# Patient Record
Sex: FEMALE | Race: WHITE | Employment: OTHER | URBAN - METROPOLITAN AREA
[De-identification: names, ages, dates, MRNs, and addresses within clinical notes are randomized per-mention and may not be internally consistent; named-entity substitution may affect disease eponyms.]

---

## 2017-04-27 PROBLEM — I50.9 CHRONIC CONGESTIVE HEART FAILURE, UNSPECIFIED CONGESTIVE HEART FAILURE TYPE: Status: ACTIVE | Noted: 2017-04-27

## 2017-06-06 PROBLEM — Z47.89 ORTHOPEDIC AFTERCARE: Status: ACTIVE | Noted: 2017-06-06

## 2017-06-15 PROBLEM — M17.12 PRIMARY OSTEOARTHRITIS OF LEFT KNEE: Status: ACTIVE | Noted: 2017-06-15

## 2018-05-02 PROBLEM — I70.0 AORTIC ATHEROSCLEROSIS (HCC): Status: ACTIVE | Noted: 2018-05-02

## 2018-05-03 PROBLEM — F33.1 MODERATE EPISODE OF RECURRENT MAJOR DEPRESSIVE DISORDER (HCC): Status: ACTIVE | Noted: 2018-05-03

## 2018-05-03 PROCEDURE — 87088 URINE BACTERIA CULTURE: CPT | Performed by: FAMILY MEDICINE

## 2018-05-03 PROCEDURE — 87186 SC STD MICRODIL/AGAR DIL: CPT | Performed by: FAMILY MEDICINE

## 2018-05-03 PROCEDURE — 87086 URINE CULTURE/COLONY COUNT: CPT | Performed by: FAMILY MEDICINE

## 2018-05-13 PROBLEM — M17.12 PRIMARY OSTEOARTHRITIS OF LEFT KNEE: Status: RESOLVED | Noted: 2017-06-15 | Resolved: 2018-05-13

## 2018-05-13 PROBLEM — I50.9 CHRONIC CONGESTIVE HEART FAILURE, UNSPECIFIED CONGESTIVE HEART FAILURE TYPE: Status: RESOLVED | Noted: 2017-04-27 | Resolved: 2018-05-13

## 2018-05-13 PROBLEM — Z47.89 ORTHOPEDIC AFTERCARE: Status: RESOLVED | Noted: 2017-06-06 | Resolved: 2018-05-13

## 2018-05-13 PROBLEM — I50.9 CHRONIC CONGESTIVE HEART FAILURE, UNSPECIFIED HEART FAILURE TYPE (HCC): Status: ACTIVE | Noted: 2017-04-27

## 2018-09-04 PROCEDURE — 81003 URINALYSIS AUTO W/O SCOPE: CPT | Performed by: FAMILY MEDICINE

## 2018-09-04 PROCEDURE — 87086 URINE CULTURE/COLONY COUNT: CPT | Performed by: FAMILY MEDICINE

## 2018-09-13 PROBLEM — R55 SYNCOPE, UNSPECIFIED SYNCOPE TYPE: Status: ACTIVE | Noted: 2018-09-13

## 2019-05-13 PROBLEM — J84.10 LUNG FIBROSIS (HCC): Status: ACTIVE | Noted: 2019-05-13

## 2020-10-06 PROBLEM — G31.9 CEREBELLAR ATROPHY (HCC): Status: ACTIVE | Noted: 2020-10-06

## 2020-10-27 PROBLEM — E66.01 OBESITY, MORBID (HCC): Status: ACTIVE | Noted: 2020-10-27

## 2020-11-04 PROBLEM — I10 ESSENTIAL HYPERTENSION: Status: ACTIVE | Noted: 2020-11-04

## 2021-03-10 DIAGNOSIS — Z23 NEED FOR VACCINATION: ICD-10-CM

## 2021-04-07 ENCOUNTER — OFFICE VISIT (OUTPATIENT)
Dept: DERMATOLOGY | Age: 86
End: 2021-04-07

## 2021-04-07 DIAGNOSIS — L81.4 LENTIGINES: ICD-10-CM

## 2021-04-07 DIAGNOSIS — D48.5 NEOPLASM OF UNCERTAIN BEHAVIOR OF SKIN: Primary | ICD-10-CM

## 2021-04-07 DIAGNOSIS — L82.1 OTHER SEBORRHEIC KERATOSIS: ICD-10-CM

## 2021-04-07 PROCEDURE — 99203 OFFICE O/P NEW LOW 30 MIN: CPT | Performed by: DERMATOLOGY

## 2021-04-07 PROCEDURE — 11102 TANGNTL BX SKIN SINGLE LES: CPT | Performed by: DERMATOLOGY

## 2021-04-07 RX ORDER — AMLODIPINE BESYLATE 5 MG/1
TABLET ORAL
COMMUNITY
Start: 2021-03-23

## 2021-04-07 RX ORDER — ASPIRIN 81 MG/1
TABLET ORAL
COMMUNITY

## 2021-04-07 RX ORDER — OXYBUTYNIN CHLORIDE 10 MG/1
TABLET, EXTENDED RELEASE ORAL
COMMUNITY
Start: 2021-03-15

## 2021-04-07 RX ORDER — MULTIVITAMIN,THER AND MINERALS
1 TABLET ORAL
COMMUNITY

## 2021-04-07 RX ORDER — METOPROLOL SUCCINATE 50 MG/1
TABLET, EXTENDED RELEASE ORAL
COMMUNITY
Start: 2021-03-04

## 2021-04-07 RX ORDER — FUROSEMIDE 20 MG/1
TABLET ORAL
COMMUNITY
Start: 2021-03-23

## 2021-04-07 RX ORDER — PANTOPRAZOLE SODIUM 40 MG/1
TABLET, DELAYED RELEASE ORAL
COMMUNITY
Start: 2021-03-04

## 2021-04-12 LAB — PATH REPORT PLASRBC-IMP: NORMAL

## 2021-04-16 ENCOUNTER — TELEPHONE (OUTPATIENT)
Dept: OTOLARYNGOLOGY | Age: 86
End: 2021-04-16

## 2021-04-16 DIAGNOSIS — D04.39 SQUAMOUS CELL CARCINOMA IN SITU (SCCIS) OF SKIN OF CHEEK: Primary | ICD-10-CM

## 2021-05-03 ENCOUNTER — LAB REQUISITION (OUTPATIENT)
Dept: LAB | Age: 86
End: 2021-05-03

## 2021-05-03 ENCOUNTER — OFFICE VISIT (OUTPATIENT)
Dept: OTOLARYNGOLOGY | Age: 86
End: 2021-05-03

## 2021-05-03 DIAGNOSIS — C44.92 SQUAMOUS CELL CARCINOMA OF SKIN: ICD-10-CM

## 2021-05-03 DIAGNOSIS — C44.92 SQUAMOUS CELL CARCINOMA OF SKIN: Primary | ICD-10-CM

## 2021-05-03 DIAGNOSIS — L57.0 ACTINIC KERATOSIS: ICD-10-CM

## 2021-05-03 DIAGNOSIS — D04.39 CARCINOMA IN SITU OF SKIN OF OTHER PARTS OF FACE: ICD-10-CM

## 2021-05-03 DIAGNOSIS — C44.92 SQUAMOUS CELL CARCINOMA OF SKIN, UNSPECIFIED: ICD-10-CM

## 2021-05-03 DIAGNOSIS — D04.39 SQUAMOUS CELL CARCINOMA IN SITU (SCCIS) OF SKIN OF CHEEK: ICD-10-CM

## 2021-05-03 PROCEDURE — 88305 TISSUE EXAM BY PATHOLOGIST: CPT | Performed by: PATHOLOGY

## 2021-05-03 PROCEDURE — 88305 TISSUE EXAM BY PATHOLOGIST: CPT | Performed by: CLINICAL MEDICAL LABORATORY

## 2021-05-03 PROCEDURE — 88331 PATH CONSLTJ SURG 1 BLK 1SPC: CPT | Performed by: PATHOLOGY

## 2021-05-03 PROCEDURE — 12051 INTMD RPR FACE/MM 2.5 CM/<: CPT | Performed by: OTOLARYNGOLOGY

## 2021-05-03 PROCEDURE — 11641 EXC F/E/E/N/L MAL+MRG 0.6-1: CPT | Performed by: OTOLARYNGOLOGY

## 2021-05-04 LAB — AP REPORT: NORMAL

## 2021-05-05 ENCOUNTER — TELEPHONE (OUTPATIENT)
Dept: OTOLARYNGOLOGY | Age: 86
End: 2021-05-05

## 2021-05-10 ENCOUNTER — OFFICE VISIT (OUTPATIENT)
Dept: OTOLARYNGOLOGY | Age: 86
End: 2021-05-10

## 2021-05-10 DIAGNOSIS — D04.39 SQUAMOUS CELL CARCINOMA IN SITU (SCCIS) OF SKIN OF CHEEK: Primary | ICD-10-CM

## 2021-05-10 PROCEDURE — 99024 POSTOP FOLLOW-UP VISIT: CPT | Performed by: OTOLARYNGOLOGY

## 2021-07-16 LAB
CASE RPRT: NORMAL
PATH REPORT.FINAL DX SPEC: NORMAL

## 2021-07-22 ENCOUNTER — TELEPHONE (OUTPATIENT)
Dept: DERMATOLOGY | Age: 86
End: 2021-07-22

## 2021-07-28 ENCOUNTER — EXTERNAL RECORD (OUTPATIENT)
Dept: HEALTH INFORMATION MANAGEMENT | Facility: OTHER | Age: 86
End: 2021-07-28

## 2021-10-06 ENCOUNTER — APPOINTMENT (OUTPATIENT)
Dept: DERMATOLOGY | Age: 86
End: 2021-10-06

## 2022-03-03 PROBLEM — J84.10 LUNG FIBROSIS (HCC): Status: RESOLVED | Noted: 2019-05-13 | Resolved: 2022-03-03

## 2022-03-03 PROBLEM — E66.01 OBESITY, MORBID (HCC): Status: RESOLVED | Noted: 2020-10-27 | Resolved: 2022-03-03

## 2022-03-03 PROBLEM — E66.9 OBESITY (BMI 30-39.9): Status: ACTIVE | Noted: 2022-03-03

## 2022-03-03 PROBLEM — R55 SYNCOPE, UNSPECIFIED SYNCOPE TYPE: Status: RESOLVED | Noted: 2018-09-13 | Resolved: 2022-03-03

## 2022-03-03 PROBLEM — E66.9 OBESITY (BMI 30-39.9): Status: RESOLVED | Noted: 2022-03-03 | Resolved: 2022-03-03

## 2022-03-03 PROBLEM — E11.9 TYPE 2 DIABETES MELLITUS WITHOUT COMPLICATION, WITHOUT LONG-TERM CURRENT USE OF INSULIN (HCC): Status: ACTIVE | Noted: 2022-03-03

## 2023-09-06 ENCOUNTER — TELEPHONE (OUTPATIENT)
Dept: FAMILY MEDICINE CLINIC | Facility: CLINIC | Age: 88
End: 2023-09-06

## 2023-09-06 RX ORDER — CHOLECALCIFEROL (VITAMIN D3) 125 MCG
2000 CAPSULE ORAL DAILY
COMMUNITY

## 2023-09-06 RX ORDER — LEVOTHYROXINE SODIUM 0.05 MG/1
1 TABLET ORAL DAILY
COMMUNITY
Start: 2023-07-15

## 2023-09-06 RX ORDER — FAMOTIDINE 20 MG/1
20 TABLET, FILM COATED ORAL NIGHTLY PRN
COMMUNITY

## 2023-09-06 NOTE — TELEPHONE ENCOUNTER
09/26/2023 RTKA with Dr. Mirela Nieves at [address] [place] [address]    H&P- completed 09/08/23  Labs - CBC and BMP done 08/28/23- BUN (23.0), MPV (12.2). A1C (6.7), MRSA neg, all other labs WNL  EKG - normal sinus rhythm, right axis deviation, low voltage QRS, consider pulmonary disease, pericardial effusion, or normal variant. CXR - 12/26/2022 ([facility]) - Mild thoracic hyperexpansion, reflecting generalized air trapping, likely due to acute-chronic   bronchitis and/or diffuse pulmonary emphysema, without airspace consolidation or pleural effusions. 08/29/2023 Nuclear Stress Test ([facility]) - Normal  02/28/2023 Echo ([facility]) - mild aortic, mitral, and tricuspid valve regurgitation      Cardiology: Dr. Ann Marie Escalante  Phone: [phone]  Fax: [phone]  Cardiac Clx scanned into Media 09/19/23  \"Patient is at acceptable risks for upcoming knee surgery, may hold ASA for 7 days. \"

## 2023-09-06 NOTE — TELEPHONE ENCOUNTER
RTKA on 09/19/23 with Dr. Fabiano Figueroa @ [address] [place] [address]&P-  Labs-  [facility]-  X-ray-

## 2023-09-08 ENCOUNTER — OFFICE VISIT (OUTPATIENT)
Dept: FAMILY MEDICINE CLINIC | Facility: CLINIC | Age: 88
End: 2023-09-08
Payer: MEDICARE

## 2023-09-08 ENCOUNTER — LAB ENCOUNTER (OUTPATIENT)
Dept: LAB | Facility: HOSPITAL | Age: 88
End: 2023-09-08
Attending: FAMILY MEDICINE
Payer: MEDICARE

## 2023-09-08 VITALS
DIASTOLIC BLOOD PRESSURE: 62 MMHG | HEIGHT: 64 IN | BODY MASS INDEX: 36.7 KG/M2 | RESPIRATION RATE: 16 BRPM | WEIGHT: 215 LBS | OXYGEN SATURATION: 96 % | SYSTOLIC BLOOD PRESSURE: 118 MMHG | HEART RATE: 72 BPM | TEMPERATURE: 96 F

## 2023-09-08 DIAGNOSIS — E66.9 OBESITY (BMI 30-39.9): ICD-10-CM

## 2023-09-08 DIAGNOSIS — M17.11 PRIMARY OSTEOARTHRITIS OF RIGHT KNEE: Primary | ICD-10-CM

## 2023-09-08 DIAGNOSIS — I27.20 PULMONARY HYPERTENSION (HCC): ICD-10-CM

## 2023-09-08 DIAGNOSIS — Z85.3 HISTORY OF BREAST CANCER: ICD-10-CM

## 2023-09-08 DIAGNOSIS — R73.01 ELEVATED FASTING BLOOD SUGAR: ICD-10-CM

## 2023-09-08 DIAGNOSIS — I10 ESSENTIAL HYPERTENSION: ICD-10-CM

## 2023-09-08 DIAGNOSIS — Z01.812 PRE-OPERATIVE LABORATORY EXAMINATION: ICD-10-CM

## 2023-09-08 DIAGNOSIS — F33.1 MODERATE EPISODE OF RECURRENT MAJOR DEPRESSIVE DISORDER (HCC): ICD-10-CM

## 2023-09-08 DIAGNOSIS — Z01.818 PREOPERATIVE EXAMINATION, UNSPECIFIED: ICD-10-CM

## 2023-09-08 DIAGNOSIS — E11.9 TYPE 2 DIABETES MELLITUS WITHOUT COMPLICATION, WITHOUT LONG-TERM CURRENT USE OF INSULIN (HCC): ICD-10-CM

## 2023-09-08 DIAGNOSIS — E78.5 DYSLIPIDEMIA: ICD-10-CM

## 2023-09-08 DIAGNOSIS — M47.816 LUMBAR SPONDYLOSIS: ICD-10-CM

## 2023-09-08 DIAGNOSIS — G31.9 CEREBELLAR ATROPHY (HCC): ICD-10-CM

## 2023-09-08 DIAGNOSIS — K21.9 GASTROESOPHAGEAL REFLUX DISEASE WITHOUT ESOPHAGITIS: ICD-10-CM

## 2023-09-08 DIAGNOSIS — I49.3 PVC (PREMATURE VENTRICULAR CONTRACTION): ICD-10-CM

## 2023-09-08 DIAGNOSIS — I70.0 AORTIC ATHEROSCLEROSIS (HCC): ICD-10-CM

## 2023-09-08 DIAGNOSIS — J41.0 SIMPLE CHRONIC BRONCHITIS (HCC): ICD-10-CM

## 2023-09-08 DIAGNOSIS — I50.9 CHRONIC CONGESTIVE HEART FAILURE, UNSPECIFIED HEART FAILURE TYPE (HCC): ICD-10-CM

## 2023-09-08 LAB
ANTIBODY SCREEN: POSITIVE
DIRECT COOMBS POLY: NEGATIVE
EST. AVERAGE GLUCOSE BLD GHB EST-MCNC: 146 MG/DL (ref 68–126)
HBA1C MFR BLD: 6.7 % (ref ?–5.7)
RH BLOOD TYPE: NEGATIVE
RH BLOOD TYPE: NEGATIVE

## 2023-09-08 PROCEDURE — 87081 CULTURE SCREEN ONLY: CPT

## 2023-09-08 PROCEDURE — 99204 OFFICE O/P NEW MOD 45 MIN: CPT | Performed by: FAMILY MEDICINE

## 2023-09-08 PROCEDURE — 3074F SYST BP LT 130 MM HG: CPT | Performed by: FAMILY MEDICINE

## 2023-09-08 PROCEDURE — 1159F MED LIST DOCD IN RCRD: CPT | Performed by: FAMILY MEDICINE

## 2023-09-08 PROCEDURE — 86077 PHYS BLOOD BANK SERV XMATCH: CPT | Performed by: FAMILY MEDICINE

## 2023-09-08 PROCEDURE — 1125F AMNT PAIN NOTED PAIN PRSNT: CPT | Performed by: FAMILY MEDICINE

## 2023-09-08 PROCEDURE — 86850 RBC ANTIBODY SCREEN: CPT | Performed by: FAMILY MEDICINE

## 2023-09-08 PROCEDURE — 86870 RBC ANTIBODY IDENTIFICATION: CPT | Performed by: FAMILY MEDICINE

## 2023-09-08 PROCEDURE — 86900 BLOOD TYPING SEROLOGIC ABO: CPT | Performed by: FAMILY MEDICINE

## 2023-09-08 PROCEDURE — 83036 HEMOGLOBIN GLYCOSYLATED A1C: CPT

## 2023-09-08 PROCEDURE — 86901 BLOOD TYPING SEROLOGIC RH(D): CPT | Performed by: FAMILY MEDICINE

## 2023-09-08 PROCEDURE — 93010 ELECTROCARDIOGRAM REPORT: CPT | Performed by: INTERNAL MEDICINE

## 2023-09-08 PROCEDURE — 3078F DIAST BP <80 MM HG: CPT | Performed by: FAMILY MEDICINE

## 2023-09-08 PROCEDURE — 36415 COLL VENOUS BLD VENIPUNCTURE: CPT

## 2023-09-08 PROCEDURE — 86880 COOMBS TEST DIRECT: CPT | Performed by: FAMILY MEDICINE

## 2023-09-08 PROCEDURE — 3008F BODY MASS INDEX DOCD: CPT | Performed by: FAMILY MEDICINE

## 2023-09-08 PROCEDURE — 1160F RVW MEDS BY RX/DR IN RCRD: CPT | Performed by: FAMILY MEDICINE

## 2023-09-08 PROCEDURE — 93005 ELECTROCARDIOGRAM TRACING: CPT

## 2023-09-09 LAB
ATRIAL RATE: 70 BPM
P-R INTERVAL: 124 MS
Q-T INTERVAL: 404 MS
QRS DURATION: 84 MS
QTC CALCULATION (BEZET): 436 MS
R AXIS: 136 DEGREES
T AXIS: 147 DEGREES
VENTRICULAR RATE: 70 BPM

## 2023-09-19 NOTE — TELEPHONE ENCOUNTER
Dr. Veronica Rose, please review:    Labs - CBC and BMP done 08/28/23- BUN (23.0), MPV (12.2). A1C (6.7), MRSA neg, all other labs WNL  EKG - normal sinus rhythm, right axis deviation, low voltage QRS, consider pulmonary disease, pericardial effusion, or normal variant. CXR - 12/26/2022 ([facility]) - Mild thoracic hyperexpansion, reflecting generalized air trapping, likely due to acute-chronic   bronchitis and/or diffuse pulmonary emphysema, without airspace consolidation or pleural effusions. Cardiology: Dr. Anat Hardwick  Phone: [phone]  Fax: [phone]  Cardiac Clx scanned into Media 09/19/23  \"Patient is at acceptable risks for upcoming knee surgery, may hold ASA for 7 days. \"    OK for surgery?

## 2023-09-20 PROBLEM — M17.11 PRIMARY OSTEOARTHRITIS OF ONE KNEE, RIGHT: Status: ACTIVE | Noted: 2023-09-20

## 2023-09-21 NOTE — TELEPHONE ENCOUNTER
Left message for patient with test results, that she is clear for surgery, and that she should still be holding ASA as directed by Cardio. Patient can call back if she has any questions.     Cell# not working

## 2023-09-22 NOTE — CM/SW NOTE
SW was notified the pt. Is a pre-ortho with [facility]. [address] [place] [address] to send [facility] referral post op. [facility] orders and face to face to be sent after entered by APN.       [facility] ext [phone]

## 2023-09-26 ENCOUNTER — HOSPITAL ENCOUNTER (INPATIENT)
Facility: HOSPITAL | Age: 88
LOS: 1 days | Discharge: HOME HEALTH | DRG: 470 | End: 2023-09-27
Attending: ORTHOPAEDIC SURGERY | Admitting: ORTHOPAEDIC SURGERY
Payer: MEDICARE

## 2023-09-26 ENCOUNTER — ANESTHESIA EVENT (OUTPATIENT)
Dept: SURGERY | Facility: HOSPITAL | Age: 88
DRG: 470 | End: 2023-09-26
Payer: MEDICARE

## 2023-09-26 ENCOUNTER — APPOINTMENT (OUTPATIENT)
Dept: GENERAL RADIOLOGY | Facility: HOSPITAL | Age: 88
DRG: 470 | End: 2023-09-26
Attending: ORTHOPAEDIC SURGERY
Payer: MEDICARE

## 2023-09-26 ENCOUNTER — ANESTHESIA (OUTPATIENT)
Dept: SURGERY | Facility: HOSPITAL | Age: 88
DRG: 470 | End: 2023-09-26
Payer: MEDICARE

## 2023-09-26 PROBLEM — M17.11 OSTEOARTHRITIS OF RIGHT KNEE, UNSPECIFIED OSTEOARTHRITIS TYPE: Status: ACTIVE | Noted: 2023-09-26

## 2023-09-26 LAB
ANION GAP SERPL CALC-SCNC: 5 MMOL/L (ref 0–18)
BASOPHILS # BLD AUTO: 0.08 X10(3) UL (ref 0–0.2)
BASOPHILS NFR BLD AUTO: 0.7 %
BUN BLD-MCNC: 21 MG/DL (ref 7–18)
BUN/CREAT SERPL: 21.2 (ref 10–20)
CALCIUM BLD-MCNC: 8.4 MG/DL (ref 8.5–10.1)
CHLORIDE SERPL-SCNC: 106 MMOL/L (ref 98–112)
CO2 SERPL-SCNC: 30 MMOL/L (ref 21–32)
CREAT BLD-MCNC: 0.99 MG/DL
DEPRECATED RDW RBC AUTO: 48.4 FL (ref 35.1–46.3)
EGFRCR SERPLBLD CKD-EPI 2021: 56 ML/MIN/1.73M2 (ref 60–?)
EOSINOPHIL # BLD AUTO: 0.07 X10(3) UL (ref 0–0.7)
EOSINOPHIL NFR BLD AUTO: 0.6 %
ERYTHROCYTE [DISTWIDTH] IN BLOOD BY AUTOMATED COUNT: 13.2 % (ref 11–15)
GLUCOSE BLD-MCNC: 160 MG/DL (ref 70–99)
GLUCOSE BLDC GLUCOMTR-MCNC: 113 MG/DL (ref 70–99)
GLUCOSE BLDC GLUCOMTR-MCNC: 123 MG/DL (ref 70–99)
GLUCOSE BLDC GLUCOMTR-MCNC: 126 MG/DL (ref 70–99)
GLUCOSE BLDC GLUCOMTR-MCNC: 130 MG/DL (ref 70–99)
HCT VFR BLD AUTO: 48 %
HGB BLD-MCNC: 15 G/DL
IMM GRANULOCYTES # BLD AUTO: 0.05 X10(3) UL (ref 0–1)
IMM GRANULOCYTES NFR BLD: 0.4 %
LYMPHOCYTES # BLD AUTO: 3.29 X10(3) UL (ref 1–4)
LYMPHOCYTES NFR BLD AUTO: 26.8 %
MAGNESIUM SERPL-MCNC: 1.8 MG/DL (ref 1.6–2.6)
MCH RBC QN AUTO: 30.7 PG (ref 26–34)
MCHC RBC AUTO-ENTMCNC: 31.3 G/DL (ref 31–37)
MCV RBC AUTO: 98.4 FL
MONOCYTES # BLD AUTO: 1.18 X10(3) UL (ref 0.1–1)
MONOCYTES NFR BLD AUTO: 9.6 %
NEUTROPHILS # BLD AUTO: 7.59 X10 (3) UL (ref 1.5–7.7)
NEUTROPHILS # BLD AUTO: 7.59 X10(3) UL (ref 1.5–7.7)
NEUTROPHILS NFR BLD AUTO: 61.9 %
OSMOLALITY SERPL CALC.SUM OF ELEC: 298 MOSM/KG (ref 275–295)
PLATELET # BLD AUTO: 335 10(3)UL (ref 150–450)
POTASSIUM SERPL-SCNC: 3.9 MMOL/L (ref 3.5–5.1)
Q-T INTERVAL: 334 MS
QRS DURATION: 98 MS
QTC CALCULATION (BEZET): 449 MS
R AXIS: 41 DEGREES
RBC # BLD AUTO: 4.88 X10(6)UL
SODIUM SERPL-SCNC: 141 MMOL/L (ref 136–145)
T AXIS: -27 DEGREES
T4 FREE SERPL-MCNC: 1.3 NG/DL (ref 0.8–1.7)
TSI SER-ACNC: 5.29 MIU/ML (ref 0.36–3.74)
VENTRICULAR RATE: 109 BPM
WBC # BLD AUTO: 12.3 X10(3) UL (ref 4–11)

## 2023-09-26 PROCEDURE — 93010 ELECTROCARDIOGRAM REPORT: CPT | Performed by: INTERNAL MEDICINE

## 2023-09-26 PROCEDURE — 88311 DECALCIFY TISSUE: CPT | Performed by: ORTHOPAEDIC SURGERY

## 2023-09-26 PROCEDURE — 82962 GLUCOSE BLOOD TEST: CPT

## 2023-09-26 PROCEDURE — 93005 ELECTROCARDIOGRAM TRACING: CPT

## 2023-09-26 PROCEDURE — 88305 TISSUE EXAM BY PATHOLOGIST: CPT | Performed by: ORTHOPAEDIC SURGERY

## 2023-09-26 PROCEDURE — 73560 X-RAY EXAM OF KNEE 1 OR 2: CPT | Performed by: ORTHOPAEDIC SURGERY

## 2023-09-26 PROCEDURE — 84443 ASSAY THYROID STIM HORMONE: CPT | Performed by: INTERNAL MEDICINE

## 2023-09-26 PROCEDURE — 80048 BASIC METABOLIC PNL TOTAL CA: CPT | Performed by: INTERNAL MEDICINE

## 2023-09-26 PROCEDURE — 97530 THERAPEUTIC ACTIVITIES: CPT

## 2023-09-26 PROCEDURE — 85025 COMPLETE CBC W/AUTO DIFF WBC: CPT | Performed by: INTERNAL MEDICINE

## 2023-09-26 PROCEDURE — 0SRC0J9 REPLACEMENT OF RIGHT KNEE JOINT WITH SYNTHETIC SUBSTITUTE, CEMENTED, OPEN APPROACH: ICD-10-PCS | Performed by: ORTHOPAEDIC SURGERY

## 2023-09-26 PROCEDURE — 97161 PT EVAL LOW COMPLEX 20 MIN: CPT

## 2023-09-26 PROCEDURE — 84439 ASSAY OF FREE THYROXINE: CPT | Performed by: INTERNAL MEDICINE

## 2023-09-26 PROCEDURE — 83735 ASSAY OF MAGNESIUM: CPT | Performed by: INTERNAL MEDICINE

## 2023-09-26 DEVICE — CEMENT BNE FULL DOSE PMMA M VISC RADPQ MNL: Type: IMPLANTABLE DEVICE | Site: KNEE | Status: FUNCTIONAL

## 2023-09-26 DEVICE — IMPLANTABLE DEVICE
Type: IMPLANTABLE DEVICE | Site: KNEE | Status: FUNCTIONAL
Brand: PERSONA®

## 2023-09-26 DEVICE — IMPLANTABLE DEVICE
Type: IMPLANTABLE DEVICE | Site: KNEE | Status: FUNCTIONAL
Brand: PERSONA® VIVACIT-E®

## 2023-09-26 DEVICE — IMPLANTABLE DEVICE
Type: IMPLANTABLE DEVICE | Site: KNEE | Status: FUNCTIONAL
Brand: PERSONA® NATURAL TIBIA®

## 2023-09-26 RX ORDER — SODIUM CHLORIDE, SODIUM LACTATE, POTASSIUM CHLORIDE, CALCIUM CHLORIDE 600; 310; 30; 20 MG/100ML; MG/100ML; MG/100ML; MG/100ML
INJECTION, SOLUTION INTRAVENOUS CONTINUOUS
Status: DISCONTINUED | OUTPATIENT
Start: 2023-09-26 | End: 2023-09-27

## 2023-09-26 RX ORDER — CEFAZOLIN SODIUM/WATER 2 G/20 ML
2 SYRINGE (ML) INTRAVENOUS EVERY 8 HOURS
Qty: 40 ML | Refills: 0 | Status: COMPLETED | OUTPATIENT
Start: 2023-09-26 | End: 2023-09-26

## 2023-09-26 RX ORDER — AMLODIPINE BESYLATE 5 MG/1
5 TABLET ORAL DAILY
Status: DISCONTINUED | OUTPATIENT
Start: 2023-09-26 | End: 2023-09-26

## 2023-09-26 RX ORDER — PANTOPRAZOLE SODIUM 40 MG/1
40 TABLET, DELAYED RELEASE ORAL
Qty: 30 TABLET | Refills: 0 | Status: SHIPPED | OUTPATIENT
Start: 2023-09-26 | End: 2023-10-26

## 2023-09-26 RX ORDER — LIDOCAINE HYDROCHLORIDE 10 MG/ML
INJECTION, SOLUTION EPIDURAL; INFILTRATION; INTRACAUDAL; PERINEURAL AS NEEDED
Status: DISCONTINUED | OUTPATIENT
Start: 2023-09-26 | End: 2023-09-26 | Stop reason: SURG

## 2023-09-26 RX ORDER — TRAMADOL HYDROCHLORIDE 50 MG/1
50 TABLET ORAL EVERY 4 HOURS PRN
Status: DISCONTINUED | OUTPATIENT
Start: 2023-09-26 | End: 2023-09-27

## 2023-09-26 RX ORDER — CEFAZOLIN SODIUM/WATER 2 G/20 ML
2 SYRINGE (ML) INTRAVENOUS ONCE
Status: COMPLETED | OUTPATIENT
Start: 2023-09-26 | End: 2023-09-26

## 2023-09-26 RX ORDER — METOCLOPRAMIDE 10 MG/1
10 TABLET ORAL ONCE
Status: COMPLETED | OUTPATIENT
Start: 2023-09-26 | End: 2023-09-26

## 2023-09-26 RX ORDER — NALOXONE HYDROCHLORIDE 0.4 MG/ML
80 INJECTION, SOLUTION INTRAMUSCULAR; INTRAVENOUS; SUBCUTANEOUS AS NEEDED
Status: DISCONTINUED | OUTPATIENT
Start: 2023-09-26 | End: 2023-09-26 | Stop reason: HOSPADM

## 2023-09-26 RX ORDER — ASPIRIN 325 MG
325 TABLET ORAL 2 TIMES DAILY
Status: DISCONTINUED | OUTPATIENT
Start: 2023-09-26 | End: 2023-09-27

## 2023-09-26 RX ORDER — MORPHINE SULFATE 4 MG/ML
4 INJECTION, SOLUTION INTRAMUSCULAR; INTRAVENOUS EVERY 10 MIN PRN
Status: DISCONTINUED | OUTPATIENT
Start: 2023-09-26 | End: 2023-09-26 | Stop reason: HOSPADM

## 2023-09-26 RX ORDER — HYDROCODONE BITARTRATE AND ACETAMINOPHEN 5; 325 MG/1; MG/1
1 TABLET ORAL ONCE AS NEEDED
Status: DISCONTINUED | OUTPATIENT
Start: 2023-09-26 | End: 2023-09-26 | Stop reason: HOSPADM

## 2023-09-26 RX ORDER — NALOXONE HYDROCHLORIDE 4 MG/.1ML
4 SPRAY NASAL AS NEEDED
Qty: 1 KIT | Refills: 0 | Status: SHIPPED | OUTPATIENT
Start: 2023-09-26

## 2023-09-26 RX ORDER — OXYCODONE HCL 10 MG/1
10 TABLET, FILM COATED, EXTENDED RELEASE ORAL ONCE
Status: COMPLETED | OUTPATIENT
Start: 2023-09-26 | End: 2023-09-26

## 2023-09-26 RX ORDER — HYDROMORPHONE HYDROCHLORIDE 1 MG/ML
0.2 INJECTION, SOLUTION INTRAMUSCULAR; INTRAVENOUS; SUBCUTANEOUS EVERY 5 MIN PRN
Status: DISCONTINUED | OUTPATIENT
Start: 2023-09-26 | End: 2023-09-26 | Stop reason: HOSPADM

## 2023-09-26 RX ORDER — ACETAMINOPHEN 500 MG
1000 TABLET ORAL ONCE
Status: COMPLETED | OUTPATIENT
Start: 2023-09-26 | End: 2023-09-26

## 2023-09-26 RX ORDER — LEVOTHYROXINE SODIUM 0.05 MG/1
50 TABLET ORAL DAILY
Status: DISCONTINUED | OUTPATIENT
Start: 2023-09-26 | End: 2023-09-27

## 2023-09-26 RX ORDER — IBUPROFEN 400 MG/1
400 TABLET ORAL 4 TIMES DAILY
Status: DISCONTINUED | OUTPATIENT
Start: 2023-09-26 | End: 2023-09-27

## 2023-09-26 RX ORDER — ASPIRIN 325 MG
325 TABLET ORAL 2 TIMES DAILY
Qty: 60 TABLET | Refills: 0 | Status: SHIPPED | OUTPATIENT
Start: 2023-09-26

## 2023-09-26 RX ORDER — SENNOSIDES 8.6 MG
17.2 TABLET ORAL NIGHTLY
Status: DISCONTINUED | OUTPATIENT
Start: 2023-09-26 | End: 2023-09-27

## 2023-09-26 RX ORDER — HYDROMORPHONE HYDROCHLORIDE 1 MG/ML
0.6 INJECTION, SOLUTION INTRAMUSCULAR; INTRAVENOUS; SUBCUTANEOUS EVERY 5 MIN PRN
Status: DISCONTINUED | OUTPATIENT
Start: 2023-09-26 | End: 2023-09-26 | Stop reason: HOSPADM

## 2023-09-26 RX ORDER — POTASSIUM CHLORIDE 750 MG/1
10 TABLET, EXTENDED RELEASE ORAL DAILY
Status: DISCONTINUED | OUTPATIENT
Start: 2023-09-26 | End: 2023-09-27

## 2023-09-26 RX ORDER — MELOXICAM 15 MG/1
15 TABLET ORAL DAILY
Qty: 30 TABLET | Refills: 0 | Status: SHIPPED | OUTPATIENT
Start: 2023-09-26 | End: 2023-10-26

## 2023-09-26 RX ORDER — MORPHINE SULFATE 10 MG/ML
6 INJECTION, SOLUTION INTRAMUSCULAR; INTRAVENOUS EVERY 10 MIN PRN
Status: DISCONTINUED | OUTPATIENT
Start: 2023-09-26 | End: 2023-09-26 | Stop reason: HOSPADM

## 2023-09-26 RX ORDER — CELECOXIB 200 MG/1
200 CAPSULE ORAL ONCE
Status: COMPLETED | OUTPATIENT
Start: 2023-09-26 | End: 2023-09-26

## 2023-09-26 RX ORDER — METOPROLOL TARTRATE 5 MG/5ML
2.5 INJECTION INTRAVENOUS ONCE
Status: DISCONTINUED | OUTPATIENT
Start: 2023-09-26 | End: 2023-09-26 | Stop reason: HOSPADM

## 2023-09-26 RX ORDER — OXYCODONE HYDROCHLORIDE 5 MG/1
5 TABLET ORAL EVERY 4 HOURS PRN
Status: DISCONTINUED | OUTPATIENT
Start: 2023-09-26 | End: 2023-09-27

## 2023-09-26 RX ORDER — FUROSEMIDE 20 MG/1
20 TABLET ORAL DAILY
Status: DISCONTINUED | OUTPATIENT
Start: 2023-09-27 | End: 2023-09-27

## 2023-09-26 RX ORDER — ACETAMINOPHEN 500 MG
1000 TABLET ORAL ONCE AS NEEDED
Status: DISCONTINUED | OUTPATIENT
Start: 2023-09-26 | End: 2023-09-26 | Stop reason: HOSPADM

## 2023-09-26 RX ORDER — BISACODYL 10 MG
10 SUPPOSITORY, RECTAL RECTAL
Status: DISCONTINUED | OUTPATIENT
Start: 2023-09-26 | End: 2023-09-27

## 2023-09-26 RX ORDER — DIPHENHYDRAMINE HCL 25 MG
25 CAPSULE ORAL EVERY 4 HOURS PRN
Status: DISCONTINUED | OUTPATIENT
Start: 2023-09-26 | End: 2023-09-27

## 2023-09-26 RX ORDER — HYDROCODONE BITARTRATE AND ACETAMINOPHEN 5; 325 MG/1; MG/1
2 TABLET ORAL ONCE AS NEEDED
Status: DISCONTINUED | OUTPATIENT
Start: 2023-09-26 | End: 2023-09-26 | Stop reason: HOSPADM

## 2023-09-26 RX ORDER — DOCUSATE SODIUM 100 MG/1
100 CAPSULE, LIQUID FILLED ORAL 2 TIMES DAILY
Status: DISCONTINUED | OUTPATIENT
Start: 2023-09-26 | End: 2023-09-27

## 2023-09-26 RX ORDER — METOCLOPRAMIDE HYDROCHLORIDE 5 MG/ML
10 INJECTION INTRAMUSCULAR; INTRAVENOUS EVERY 8 HOURS PRN
Status: DISCONTINUED | OUTPATIENT
Start: 2023-09-26 | End: 2023-09-26 | Stop reason: HOSPADM

## 2023-09-26 RX ORDER — SODIUM CHLORIDE, SODIUM LACTATE, POTASSIUM CHLORIDE, CALCIUM CHLORIDE 600; 310; 30; 20 MG/100ML; MG/100ML; MG/100ML; MG/100ML
INJECTION, SOLUTION INTRAVENOUS CONTINUOUS
Status: DISCONTINUED | OUTPATIENT
Start: 2023-09-26 | End: 2023-09-26 | Stop reason: HOSPADM

## 2023-09-26 RX ORDER — MORPHINE SULFATE 4 MG/ML
2 INJECTION, SOLUTION INTRAMUSCULAR; INTRAVENOUS EVERY 10 MIN PRN
Status: DISCONTINUED | OUTPATIENT
Start: 2023-09-26 | End: 2023-09-26 | Stop reason: HOSPADM

## 2023-09-26 RX ORDER — AMLODIPINE BESYLATE 5 MG/1
5 TABLET ORAL NIGHTLY
Status: DISCONTINUED | OUTPATIENT
Start: 2023-09-26 | End: 2023-09-27

## 2023-09-26 RX ORDER — PHENYLEPHRINE HCL 10 MG/ML
VIAL (ML) INJECTION AS NEEDED
Status: DISCONTINUED | OUTPATIENT
Start: 2023-09-26 | End: 2023-09-26 | Stop reason: SURG

## 2023-09-26 RX ORDER — ACETAMINOPHEN 500 MG
1000 TABLET ORAL 3 TIMES DAILY
Qty: 180 TABLET | Refills: 0 | Status: SHIPPED | OUTPATIENT
Start: 2023-09-26 | End: 2023-10-26

## 2023-09-26 RX ORDER — METOPROLOL SUCCINATE 50 MG/1
50 TABLET, EXTENDED RELEASE ORAL DAILY
Status: DISCONTINUED | OUTPATIENT
Start: 2023-09-26 | End: 2023-09-27

## 2023-09-26 RX ORDER — TRAMADOL HYDROCHLORIDE 50 MG/1
50 TABLET ORAL EVERY 4 HOURS PRN
Qty: 60 TABLET | Refills: 0 | Status: SHIPPED | OUTPATIENT
Start: 2023-09-26

## 2023-09-26 RX ORDER — ACETAMINOPHEN 500 MG
500 TABLET ORAL EVERY 8 HOURS SCHEDULED
Status: DISCONTINUED | OUTPATIENT
Start: 2023-09-26 | End: 2023-09-27

## 2023-09-26 RX ORDER — NICOTINE POLACRILEX 4 MG
30 LOZENGE BUCCAL
Status: DISCONTINUED | OUTPATIENT
Start: 2023-09-26 | End: 2023-09-26 | Stop reason: HOSPADM

## 2023-09-26 RX ORDER — POLYETHYLENE GLYCOL 3350 17 G/17G
17 POWDER, FOR SOLUTION ORAL DAILY PRN
Status: DISCONTINUED | OUTPATIENT
Start: 2023-09-26 | End: 2023-09-27

## 2023-09-26 RX ORDER — ESMOLOL HYDROCHLORIDE 10 MG/ML
INJECTION INTRAVENOUS AS NEEDED
Status: DISCONTINUED | OUTPATIENT
Start: 2023-09-26 | End: 2023-09-26 | Stop reason: SURG

## 2023-09-26 RX ORDER — ENEMA 19; 7 G/133ML; G/133ML
1 ENEMA RECTAL ONCE AS NEEDED
Status: DISCONTINUED | OUTPATIENT
Start: 2023-09-26 | End: 2023-09-27

## 2023-09-26 RX ORDER — MAGNESIUM OXIDE 400 MG/1
400 TABLET ORAL ONCE
Status: COMPLETED | OUTPATIENT
Start: 2023-09-26 | End: 2023-09-26

## 2023-09-26 RX ORDER — FAMOTIDINE 20 MG/1
20 TABLET, FILM COATED ORAL ONCE
Status: COMPLETED | OUTPATIENT
Start: 2023-09-26 | End: 2023-09-26

## 2023-09-26 RX ORDER — HYDROMORPHONE HYDROCHLORIDE 1 MG/ML
0.4 INJECTION, SOLUTION INTRAMUSCULAR; INTRAVENOUS; SUBCUTANEOUS EVERY 5 MIN PRN
Status: DISCONTINUED | OUTPATIENT
Start: 2023-09-26 | End: 2023-09-26 | Stop reason: HOSPADM

## 2023-09-26 RX ORDER — TRANEXAMIC ACID 10 MG/ML
INJECTION, SOLUTION INTRAVENOUS AS NEEDED
Status: DISCONTINUED | OUTPATIENT
Start: 2023-09-26 | End: 2023-09-26 | Stop reason: SURG

## 2023-09-26 RX ORDER — OXYCODONE HYDROCHLORIDE 5 MG/1
5 TABLET ORAL EVERY 4 HOURS PRN
Qty: 40 TABLET | Refills: 0 | Status: SHIPPED | OUTPATIENT
Start: 2023-09-26

## 2023-09-26 RX ORDER — DOCUSATE SODIUM 100 MG/1
100 CAPSULE, LIQUID FILLED ORAL 2 TIMES DAILY
Qty: 60 CAPSULE | Refills: 0 | Status: SHIPPED | OUTPATIENT
Start: 2023-09-26 | End: 2023-10-26

## 2023-09-26 RX ORDER — ONDANSETRON 2 MG/ML
4 INJECTION INTRAMUSCULAR; INTRAVENOUS EVERY 6 HOURS PRN
Status: DISCONTINUED | OUTPATIENT
Start: 2023-09-26 | End: 2023-09-27

## 2023-09-26 RX ORDER — NICOTINE POLACRILEX 4 MG
15 LOZENGE BUCCAL
Status: DISCONTINUED | OUTPATIENT
Start: 2023-09-26 | End: 2023-09-26 | Stop reason: HOSPADM

## 2023-09-26 RX ORDER — GABAPENTIN 100 MG/1
200 CAPSULE ORAL 3 TIMES DAILY
Qty: 180 CAPSULE | Refills: 0 | Status: SHIPPED | OUTPATIENT
Start: 2023-09-26 | End: 2023-09-26

## 2023-09-26 RX ORDER — DEXTROSE MONOHYDRATE 25 G/50ML
50 INJECTION, SOLUTION INTRAVENOUS
Status: DISCONTINUED | OUTPATIENT
Start: 2023-09-26 | End: 2023-09-26 | Stop reason: HOSPADM

## 2023-09-26 RX ORDER — ONDANSETRON 4 MG/1
4 TABLET, FILM COATED ORAL EVERY 8 HOURS PRN
Qty: 10 TABLET | Refills: 1 | Status: SHIPPED | OUTPATIENT
Start: 2023-09-26

## 2023-09-26 RX ORDER — DIPHENHYDRAMINE HYDROCHLORIDE 50 MG/ML
12.5 INJECTION INTRAMUSCULAR; INTRAVENOUS EVERY 4 HOURS PRN
Status: DISCONTINUED | OUTPATIENT
Start: 2023-09-26 | End: 2023-09-27

## 2023-09-26 RX ORDER — ONDANSETRON 2 MG/ML
4 INJECTION INTRAMUSCULAR; INTRAVENOUS EVERY 6 HOURS PRN
Status: DISCONTINUED | OUTPATIENT
Start: 2023-09-26 | End: 2023-09-26 | Stop reason: HOSPADM

## 2023-09-26 RX ORDER — BUPIVACAINE HYDROCHLORIDE 7.5 MG/ML
INJECTION, SOLUTION INTRASPINAL AS NEEDED
Status: DISCONTINUED | OUTPATIENT
Start: 2023-09-26 | End: 2023-09-26 | Stop reason: SURG

## 2023-09-26 RX ORDER — TRANEXAMIC ACID 10 MG/ML
1000 INJECTION, SOLUTION INTRAVENOUS ONCE
Status: COMPLETED | OUTPATIENT
Start: 2023-09-27 | End: 2023-09-26

## 2023-09-26 RX ADMIN — SODIUM CHLORIDE, SODIUM LACTATE, POTASSIUM CHLORIDE, CALCIUM CHLORIDE: 600; 310; 30; 20 INJECTION, SOLUTION INTRAVENOUS at 07:18:00

## 2023-09-26 RX ADMIN — PHENYLEPHRINE HCL 50 MCG: 10 MG/ML VIAL (ML) INJECTION at 08:19:00

## 2023-09-26 RX ADMIN — CEFAZOLIN SODIUM/WATER 2 G: 2 G/20 ML SYRINGE (ML) INTRAVENOUS at 07:36:00

## 2023-09-26 RX ADMIN — SODIUM CHLORIDE, SODIUM LACTATE, POTASSIUM CHLORIDE, CALCIUM CHLORIDE: 600; 310; 30; 20 INJECTION, SOLUTION INTRAVENOUS at 09:56:00

## 2023-09-26 RX ADMIN — BUPIVACAINE HYDROCHLORIDE 1.5 ML: 7.5 INJECTION, SOLUTION INTRASPINAL at 07:28:00

## 2023-09-26 RX ADMIN — TRANEXAMIC ACID 1000 MG: 10 INJECTION, SOLUTION INTRAVENOUS at 07:38:00

## 2023-09-26 RX ADMIN — PHENYLEPHRINE HCL 50 MCG: 10 MG/ML VIAL (ML) INJECTION at 08:11:00

## 2023-09-26 RX ADMIN — ESMOLOL HYDROCHLORIDE 20 MG: 10 INJECTION INTRAVENOUS at 08:04:00

## 2023-09-26 RX ADMIN — LIDOCAINE HYDROCHLORIDE 10 MG: 10 INJECTION, SOLUTION EPIDURAL; INFILTRATION; INTRACAUDAL; PERINEURAL at 07:24:00

## 2023-09-26 RX ADMIN — SODIUM CHLORIDE, SODIUM LACTATE, POTASSIUM CHLORIDE, CALCIUM CHLORIDE: 600; 310; 30; 20 INJECTION, SOLUTION INTRAVENOUS at 07:15:00

## 2023-09-26 RX ADMIN — PHENYLEPHRINE HCL 100 MCG: 10 MG/ML VIAL (ML) INJECTION at 09:37:00

## 2023-09-26 RX ADMIN — SODIUM CHLORIDE, SODIUM LACTATE, POTASSIUM CHLORIDE, CALCIUM CHLORIDE: 600; 310; 30; 20 INJECTION, SOLUTION INTRAVENOUS at 08:44:00

## 2023-09-26 NOTE — ANESTHESIA PROCEDURE NOTES
Spinal Block    Date/Time: 9/26/2023 7:24 AM    Performed by: Saeid Melchor CRNA  Authorized by: Abdi Maza MD      General Information and Staff    Start Time:  9/26/2023 7:24 AM  End Time:  9/26/2023 7:28 AM  Anesthesiologist:  Abdi Maza MD  CRNA:  Saeid Melchor CRNA  Performed by:  CRNA  Patient Location:  OR  Site identification: surface landmarks    Reason for Block: at surgeon's request and surgical anesthesia    Preanesthetic Checklist: patient identified, IV checked, risks and benefits discussed, monitors and equipment checked, pre-op evaluation, timeout performed, anesthesia consent and sterile technique used      Procedure Details    Patient Position:  Sitting  Prep: ChloraPrep    Monitoring:  Cardiac monitor, heart rate and continuous pulse ox  Approach:  Midline  Location:  L4-5  Injection Technique:  Single-shot    Needle    Needle Type:  Pencil-tip  Needle Gauge:  24 G  Needle Length:  3.5 in    Assessment    Sensory Level:  T10  Events: clear CSF, CSF aspirated, well tolerated and blood negative      Additional Comments

## 2023-09-26 NOTE — PLAN OF CARE
Patient is alert and oriented x 4. Post op day 0. Dressing in place to Right knee. Up with walker and one person assistance. Voiding freely, SCD's and aspirin for DVT prophylaxis. Scheduled Ibuprofen for pain. Vital signs monitored. No acute changes noted throughout shift. Tolerating diet. Cough and deep breathe in addition to use of incentive spirometer. Fall precautions in place- bed in lowest position, call light and personal belongings within reach, non-skid socks in place. Frequent rounding by nursing staff. Plan is home with home health. Problem: Patient Centered Care  Goal: Patient preferences are identified and integrated in the patient's plan of care  Description: Interventions:  - What would you like us to know as we care for you?  I had my other knee done in 2016  - Provide timely, complete, and accurate information to patient/family  - Incorporate patient and family knowledge, values, beliefs, and cultural backgrounds into the planning and delivery of care  - Encourage patient/family to participate in care and decision-making at the level they choose  - Honor patient and family perspectives and choices  9/26/2023 1507 by Jas Ayers RN  Outcome: Progressing     Problem: Patient/Family Goals  Goal: Patient/Family Long Term Goal  Description: Patient's Long Term Goal: go home    Interventions:  - surgery  -therapy  -medications  - See additional Care Plan goals for specific interventions  9/26/2023 1507 by Jas Ayers RN  Outcome: Progressing     Problem: Patient/Family Goals  Goal: Patient/Family Short Term Goal  Description: Patient's Short Term Goal: pain management    Interventions:   - medications  -ice  -rest  -elevation  - See additional Care Plan goals for specific interventions  9/26/2023 1507 by Jas Ayers RN  Outcome: Progressing     Problem: PAIN - ADULT  Goal: Verbalizes/displays adequate comfort level or patient's stated pain goal  Description: INTERVENTIONS:  - Encourage pt to monitor pain and request assistance  - Assess pain using appropriate pain scale  - Administer analgesics based on type and severity of pain and evaluate response  - Implement non-pharmacological measures as appropriate and evaluate response  - Consider cultural and social influences on pain and pain management  - Manage/alleviate anxiety  - Utilize distraction and/or relaxation techniques  - Monitor for opioid side effects  - Notify MD/LIP if interventions unsuccessful or patient reports new pain  - Anticipate increased pain with activity and pre-medicate as appropriate  Outcome: Progressing     Problem: DISCHARGE PLANNING  Goal: Discharge to home or other facility with appropriate resources  Description: INTERVENTIONS:  - Identify barriers to discharge w/pt and caregiver  - Include patient/family/discharge partner in discharge planning  - Arrange for needed discharge resources and transportation as appropriate  - Identify discharge learning needs (meds, wound care, etc)  - Arrange for interpreters to assist at discharge as needed  - Consider post-discharge preferences of patient/family/discharge partner  - Complete POLST form as appropriate  - Assess patient's ability to be responsible for managing their own health  - Refer to Case Management Department for coordinating discharge planning if the patient needs post-hospital services based on physician/LIP order or complex needs related to functional status, cognitive ability or social support system  Outcome: Progressing     Problem: METABOLIC/FLUID AND ELECTROLYTES - ADULT  Goal: Electrolytes maintained within normal limits  Description: INTERVENTIONS:  - Monitor labs and rhythm and assess patient for signs and symptoms of electrolyte imbalances  - Administer electrolyte replacement as ordered  - Monitor response to electrolyte replacements, including rhythm and repeat lab results as appropriate  - Fluid restriction as ordered  - Instruct patient on fluid and nutrition restrictions as appropriate  Outcome: Progressing     Problem: SKIN/TISSUE INTEGRITY - ADULT  Goal: Incision(s), wounds(s) or drain site(s) healing without S/S of infection  Description: INTERVENTIONS:  - Assess and document risk factors for pressure ulcer development  - Assess and document skin integrity  - Assess and document dressing/incision, wound bed, drain sites and surrounding tissue  - Implement wound care per orders  - Initiate isolation precautions as appropriate  - Initiate Pressure Ulcer prevention bundle as indicated  Outcome: Progressing

## 2023-09-26 NOTE — OPERATIVE REPORT
OPERATIVE REPORT    PROCEDURE DATE  9/26/2023    ATT MD Ashok Gonsalez MD Fellow  (No qualified resident was available to assist with this case; we will thus bill for this fellow)     HOUSE-STAFF SURGEON  None. ASSISTANT  Kristen Condon, surgical assist.    PREOPERATIVE DIAGNOSIS  Right knee degenerative joint disease    POSTOPERATIVE DIAGNOSIS  Same    PROCEDURE  Cemented right total knee arthroplasty. ANESTHESIA  Spinal    PROCEDURE POSITION  Supine. PREOPERATIVE ANTIBIOTICS  Ancef 2 g IV within 60 minutes of procedure start. ESTIMATED BLOOD LOSS  50 mL. TOURNIQUET TIME  120 minutes at 250 mmHg to right lower extremity. DRAINS  None     SPECIMEN SENT TO PATHOLOGY  Removed Bone cuts. IMPLANTS  1. Crispin Persona Tibia, Size D  2. Crispin Persona Cemented All Poly Patella, 32 mm diameter  3. Crispin Persona CR, Size 8  4. Crispin Persona Vivacit-E Highly Crosslinked Polyethylene, Medial Congruent, 11 mm thickness      COMPLICATIONS  None apparent. FINDINGS  Consistent with RIGHT knee degenerative joint disease in all three compartments. INDICATIONS  Adelia Lundberg is a 82year old female who has been followed by the orthopedic surgery service for right -sided knee pain. Adelia Lundberg was previously seen and evaluated in the orthopedic clinic and diagnosed with severe knee degenerative joint disease. After nonoperative treatment measures such as physical therapy, activity modification, weight loss, and intra-articular steroid injections failed to improved the patient's symptoms, Payton Sommer wished to proceed with surgery and was therefore scheduled for the above-described procedure on an elective basis. TECHNIQUE  Adelia Lundberg was identified and greeted in the preoperative holding area and was identified using medical record number, name, and date of birth, all of which were confirmed.  The operative knee was marked using an indelible marker and informed consent was verbally confirmed. The patient had previously signed a consent in clinic. Once again, all risks and benefits as well as alternatives to surgical intervention were discussed with the patient in detail and all the questions were answered. Risks discussed included but were not limited to pain, infection, bleeding, scarring, stiffness, thromboembolic events, severe limb dysfunction, loss of limb, and loss of life. The patient verbally confirmed the consent and wished to proceed with surgery as scheduled. The anesthesia service then proceeded with spinal and epidural anesthesia. The patient was then taken to the operating room and placed on the operating room table in supine position. Care was taken to pad all bony prominences well. A tourniquet was applied, but not yet inflated to her proximal thigh. The leg was prepped and draped in the standard orthopedic fashion using ChloraPrep. A preprocedural timeout was then performed once again confirmed the patient's correct identity, correct procedure, correct side, and correct site. In addition, allergies and required equipment were reviewed. Three independent members of the operating room team agreed on the aforementioned parameters. After exsanguination using an Esmarch, the tourniquet was then inflated, and the knee was brought into flexion and a standard anterior midline incision was made centered over the patella extending from just medial to the tibial tubercle to about 2 fingerbreadths proximal to the superior pole of the patella. The incision was carried down sharply through the subcutaneous tissue until the extensor mechanism was identified. A full-thickness medial and lateral skin flap was then created and a standard medial parapatellar arthrotomy was performed using a Bovie. A medial release was then performed in the usual fashion and the fat pad was removed under careful protection of the patellar tendon using a Bovie.     Next, the ACL, PCL as well as the anterior horn of the lateral meniscus were recessed. The distal femur was then opened using the standard drill equipment and the distal femoral cutting guide was placed using an intramedullary alignment guide set at 5 degrees of valgus. It was pinned in place, and the distal femoral cut was made in the usual fashion using an oscillating saw under careful protection of MCL and LCL. Attention was then turned to the tibia and using an extramedullary alignment guide, 2 mm were taken off the medial side of the tibial plateau. This was once again done under protection of MCL and LCL. Large osteophytes on the tibia were then carefully removed using a small rongeur, and the tibia was sized. Any meniscal remnants were then carefully removed under direct visualization. The distal femur was then sized using a standard sizing equipment. The distal femoral cutting block was then placed under the distal femur and position was confirmed using [place]'s line and direct visualization of the transepicondylar axis. The anterior, posterior, and chamfer cuts were made in the usual fashion using an oscillating saw once again under careful protection of the MCL and LCL. Trial components were then placed using a Crispin Persona components. This was found to achieve full knee extension and 135 degrees of flexion. The knee was found to be stable throughout the range of motion to varus and valgus stress as well as anterior and posterior drawer and flexion. Attention was then turned to the patella. The patella was measured using the caliper. The patella was then resurfaced using an oscillating saw under careful protection of both quadriceps and patellar tendon and was sized. The patellar lug holes were then drilled in the usual fashion. A patellar trial was placed. With the knee range of motion, the patella was found to track well without liftoff or tilt.      Accordingly, all trial components were removed and the tibial preparation was finalized using a drill and punch. All bony cuts were then copiously irrigated using a pulse lavage system and dried in preparation of the cementation. The components were cemented into place starting with the tibia, followed by the femur and application of a polyethylene trial. Throughout the cementation process, any excess cement was carefully removed using a tonsil and the knee was then left in full extension until the cement was cured and the patella was cemented into place and patellar clamp was applied. Again, the entire knee was inspected for any remaining cement parts, which were carefully removed. While the cement cured, the knee was copious irrigated using normal saline and a pulse lavage system. The polyethylene trial was then exchanged for the final polyethylene liner. The knee was one final time inspected for any cement particles left behind, none of which were found. The knee was then closed in a layered fashion using #1 vicryl for the extensor mechanism at the corners and #2 quill, followed by a 2-0 Monocryl in a simple interrupted suture-type fashion for subcutaneous closure and staples for skin. An Aquacel dressing was then applied. The tourniquet was then let down after 120 minutes. The leg was then wrapped in a webril dressing followed by Ace wrap. At the end of the procedure, all sponge, needle, instrument, and scalpel counts were performed and found to be correct. Attending physician Dr. Jacob Irving was scrubbed and present for all key parts of the procedure. He supervised the entire procedure. Pt was transferred onto a regular bed and brought to the PACU in stable condition.     POSTOPERATIVE PLAN  -Admit to orthopaedics  -Pain control  -DVT ppx: mechanical + aspirin 325 mp PO BID  -WBAT  -PT on POD #0  -Kesha Murillo MD  Fellow - Adult Reconstruction  Department of Orthopaedic Surgery  9/26/2023  12:14 PM

## 2023-09-26 NOTE — BRIEF OP NOTE
Pre-Operative Diagnosis: Right knee degenerative joint disease     Post-Operative Diagnosis: Right knee degenerative joint disease      Procedure Performed:   Right total knee arthroplasty    Surgeon(s) and Role:     * Robbie Smith MD - Primary     * Humaira Carpenter MD - Assisting Surgeon    Assistant(s):  Surgical Assistant.: Billie Gill     Surgical Findings: osteoarthritis, correlated with pre-op imaging     Specimen: proximal tibia, distal femur sent to pathology     Estimated Blood Loss: Blood Output: 50 mL (9/26/2023  9:56 AM)        Donal Owusu MD  9/26/2023  10:30 AM

## 2023-09-26 NOTE — INTERVAL H&P NOTE
Pre-op Diagnosis: Right knee degenerative joint disease    The above referenced H&P was reviewed by Vanessa Amor MD on 9/26/2023, the patient was examined and no significant changes have occurred in the patient's condition since the H&P was performed. I discussed with the patient and/or legal representative the potential benefits, risks and side effects of this procedure; the likelihood of the patient achieving goals; and potential problems that might occur during recuperation. I discussed reasonable alternatives to the procedure, including risks, benefits and side effects related to the alternatives and risks related to not receiving this procedure. We will proceed with procedure as planned.         Vanessa Amor MD, 09/26/23, 6:28 AM

## 2023-09-26 NOTE — PROGRESS NOTES
[facility] at [address] [place] [address],[address] NOTE    Date: 9/26/2023    Patient: Guille Cano    Subjective:  Pain well controlled. No chest pain, shortness of breath, nausea vomiting. Pending PT evaluation       Objective:   09/26/23  1158   BP: 100/89   Pulse: 81   Resp: 16   Temp:      No intake/output data recorded. Gen: awake, alert, not in acute distress  Abdomen nondistended, non-tender    Right Lower Extremity: dressing clean, dry, intact. Sensation intact to light touch in Sural/Saphenous/Tibial/Superficial Peroneal/Deep Peroneal and Tibial distributions. Motor exam : 5/5 EHL/FHL/TA/GSC. Vascular exam: Palpable dorsalis pedis pulses. Cap refill ~2s in the toes. Foot warm and well perfused    Labs:  Lab Results   Component Value Date    HGB 15.0 09/26/2023    HGB 15.0 05/13/2019    HGB 14.4 05/08/2018     Lab Results   Component Value Date    INR <0.9 (L) 04/21/2008    PROTIME <9.6 04/21/2008         ASSESSMENT/PLAN: 82year old yo female s/p right total knee arthroplasty on tuesday 9/26/2023   - Weight bearing status: WBAT RLE  - Range of motion: As tolerated  - Mechanical DVT prophylaxis and chemoprophylaxis with ASA 325mg BID  - 24 hours of perioperative antibiotics  - PT for mobilization and motion  - Advance diet as tolerated. Discontinue IV fluids POD1 if tolerating diet.    - Multimodal pain control regimen ordered  - Disposition: Pending PT Evaluation    Azucena Swann MD  [facility] at [facility]

## 2023-09-26 NOTE — PHYSICAL THERAPY NOTE
PHYSICAL THERAPY KNEE EVALUATION - INPATIENT       Room Number: 422/422-A  Evaluation Date: 9/26/2023  Type of Evaluation: Initial  Physician Order: PT Eval and Treat    Presenting Problem: s/p R TKA on 9/26     Reason for Therapy: Mobility Dysfunction and Discharge Planning    PHYSICAL THERAPY ASSESSMENT     Patient is a 82year old female admitted 9/26/2023 for R TKA. Patient's current functional deficits include pain, weakness, impaired rom, impaired balance and functional mobility, which are below the patient's pre-admission status. Patient will benefit from continued IP PT services to address these deficits in preparation for discharge. RN approved participation with physical therapy. Pt was received resting in bed and agreeable to activity.  at bedside. Educated on VARKAUS, TKA mobility and exercise protocol, role of PT and goals for this session. Pt verbalized understanding; requesting to walk to the bathroom. Bed mobility: SBA supine<>sit and scooting to eob  Transfers: CGA for STS with RW. CGA toilet transfer. Gait: 15 ft x 2 to bathroom and back to bed. With RW and CGA. Slow but steady gait, decreased weight bearing and stance time on RLE. Step-to pattern and shuffled steps. Cues given for upright posture. No LOB. Distance limited due to pain. Pt was left resting in bed with needs within reach, SCDs and ice applied, handoff to RN complete. The patient's Approx Degree of Impairment: 46.58% has been calculated based on documentation in the [facility] '6 clicks' Inpatient Basic Mobility Short Form. Research supports that patients with this level of impairment may benefit from [facility].    DISCHARGE RECOMMENDATIONS  PT Discharge Recommendations: 24 hour care/supervision;Home with home health PT    PLAN  PT Treatment Plan: Bed mobility; Body mechanics; Endurance; Energy conservation;Patient education;Gait training;Strengthening;Range of motion;Stair training;Balance training;Transfer training  Rehab Potential : Good  Frequency (Obs): BID    PHYSICAL THERAPY MEDICAL/SOCIAL HISTORY     Problem List  Principal Problem:    Primary osteoarthritis of one knee, right  Active Problems:    Pulmonary hypertension (HCC)    Dyslipidemia    Esophageal reflux    History of breast cancer    Chronic congestive heart failure, unspecified heart failure type ([address]    Essential hypertension    Type 2 diabetes mellitus without complication, without long-term current use of insulin (HCC)    Osteoarthritis of right knee, unspecified osteoarthritis type      HOME SITUATION  Home Situation  Type of Home: House  Home Layout: One level  Stairs to Enter : 1  Lives With: Spouse  Drives: Yes  Patient Owned Equipment: None  Patient Regularly Uses: None     Prior Level of [place]: independent with ADLs and mobility without assistive device. SUBJECTIVE  Agreeable to activity. PHYSICAL THERAPY EXAMINATION     OBJECTIVE  Precautions: None  Fall Risk: Standard fall risk    WEIGHT BEARING RESTRICTION  R Lower Extremity: Weight Bearing as Tolerated    PAIN ASSESSMENT  Rating:  (did not rate)  Location: right knee  Management Techniques: Activity promotion; Body mechanics;Repositioning    COGNITION  Overall Cognitive Status:  WFL - within functional limits    RANGE OF MOTION AND STRENGTH ASSESSMENT  Upper extremity ROM and strength are within functional limits. Lower extremity ROM is within functional limits. Lower extremity strength is within functional limits . BALANCE  Static Sitting: Good  Dynamic Sitting: Fair +  Static Standing: Fair  Dynamic [facility] '6-Clicks' INPATIENT SHORT FORM - BASIC MOBILITY  How much difficulty does the patient currently have. ..   Patient Difficulty: Turning over in bed (including adjusting bedclothes, sheets and blankets)?: A Little   Patient Difficulty: Sitting down on and standing up from a chair with arms (e.g., wheelchair, bedside commode, etc.): A Little   Patient Difficulty: Moving from lying on back to sitting on the side of the bed?: A Little   How much help from another person does the patient currently need. .. Help from Another: Moving to and from a bed to a chair (including a wheelchair)?: A Little   Help from Another: Need to walk in hospital room?: A Little   Help from Another: Climbing 3-5 steps with a railing?: A Little     AM-PAC Score:  Raw Score: 18   Approx Degree of Impairment: 46.58%   Standardized Score (AM-PAC Scale): 43.63   CMS Modifier (G-Code): CK    FUNCTIONAL ABILITY STATUS  Functional Mobility/Gait Assessment  Gait Assistance: Contact guard assist  Distance (ft): 15 x 2  Assistive Device: Rolling walker  Pattern: Shuffle;R Decreased stance time (guarded posture, slow pace)    Bed Mobility: SBA    Transfers: CGA    Exercise/Education Provided:  Bed mobility  Body mechanics  Energy conservation  Functional activity tolerated  Gait training  Posture  Lower therapeutic exercise: Ankle pumps  Heel slides  LAQ  Quad sets  Transfer training    Patient End of Session: In bed;Needs met;Call light within reach;RN aware of session/findings; All patient questions and concerns addressed;SCDs in place; Ice applied; Family present    CURRENT GOALS    Goals to be met by: 10/3/23  Patient Goal Patient's self-stated goal is: go home   Goal #1 Patient is able to demonstrate supine - sit EOB @ level: modified independent     Goal #1   Current Status    Goal #2 Patient is able to demonstrate transfers Sit to/from Stand at assistance level: modified independent     Goal #2  Current Status    Goal #3 Patient is able to ambulate 150 feet with assistive device at assistance level: supervision   Goal #3   Current Status    Goal #4    Goal #4   Current Status    Goal #5  AROM 0 degrees extension to 95 degrees flexion     Goal #5   Current Status    Goal #6 Patient independently performs home exercise program for ROM/strengthening per the instructions provided in preparation for discharge.    Goal #6  Current Status        Patient Evaluation Complexity Level:  History Low - no personal factors and/or co-morbidities   Examination of body systems Low - addressing 1-2 elements   Clinical Presentation Low - Stable   Clinical Decision Making Low Complexity     Therapeutic Activity: 20 minutes

## 2023-09-26 NOTE — CM/SW NOTE
Department  notified of request for [facility], aidin referrals started. Assigned CM/SW to follow up with pt/family on further discharge planning.        Tobias Baker [facility]

## 2023-09-27 VITALS
HEIGHT: 63 IN | DIASTOLIC BLOOD PRESSURE: 72 MMHG | HEART RATE: 91 BPM | RESPIRATION RATE: 18 BRPM | OXYGEN SATURATION: 92 % | SYSTOLIC BLOOD PRESSURE: 120 MMHG | WEIGHT: 215 LBS | TEMPERATURE: 98 F | BODY MASS INDEX: 38.09 KG/M2

## 2023-09-27 PROBLEM — M17.11 OSTEOARTHRITIS OF RIGHT KNEE, UNSPECIFIED OSTEOARTHRITIS TYPE: Status: RESOLVED | Noted: 2023-09-26 | Resolved: 2023-09-27

## 2023-09-27 PROBLEM — Z96.651 STATUS POST RIGHT KNEE REPLACEMENT: Status: ACTIVE | Noted: 2023-09-27

## 2023-09-27 PROBLEM — Z96.652 S/P TKR (TOTAL KNEE REPLACEMENT), LEFT: Status: ACTIVE | Noted: 2023-09-27

## 2023-09-27 PROBLEM — Z96.652 S/P TKR (TOTAL KNEE REPLACEMENT), LEFT: Status: RESOLVED | Noted: 2023-09-27 | Resolved: 2023-09-27

## 2023-09-27 PROBLEM — Z96.652 STATUS POST LEFT KNEE REPLACEMENT: Status: ACTIVE | Noted: 2023-09-27

## 2023-09-27 PROBLEM — I48.0 PAF (PAROXYSMAL ATRIAL FIBRILLATION) (HCC): Status: ACTIVE | Noted: 2023-09-27

## 2023-09-27 LAB
ANION GAP SERPL CALC-SCNC: 6 MMOL/L (ref 0–18)
BLOOD TYPE BARCODE: 9500
BUN BLD-MCNC: 24 MG/DL (ref 7–18)
BUN/CREAT SERPL: 24.7 (ref 10–20)
CALCIUM BLD-MCNC: 8.3 MG/DL (ref 8.5–10.1)
CHLORIDE SERPL-SCNC: 105 MMOL/L (ref 98–112)
CO2 SERPL-SCNC: 28 MMOL/L (ref 21–32)
CREAT BLD-MCNC: 0.97 MG/DL
EGFRCR SERPLBLD CKD-EPI 2021: 57 ML/MIN/1.73M2 (ref 60–?)
GLUCOSE BLD-MCNC: 137 MG/DL (ref 70–99)
GLUCOSE BLDC GLUCOMTR-MCNC: 132 MG/DL (ref 70–99)
GLUCOSE BLDC GLUCOMTR-MCNC: 156 MG/DL (ref 70–99)
HCT VFR BLD AUTO: 39.9 %
HGB BLD-MCNC: 13 G/DL
MAGNESIUM SERPL-MCNC: 1.9 MG/DL (ref 1.6–2.6)
OSMOLALITY SERPL CALC.SUM OF ELEC: 294 MOSM/KG (ref 275–295)
POTASSIUM SERPL-SCNC: 3.6 MMOL/L (ref 3.5–5.1)
POTASSIUM SERPL-SCNC: 3.9 MMOL/L (ref 3.5–5.1)
SODIUM SERPL-SCNC: 139 MMOL/L (ref 136–145)
UNIT VOLUME: 350 ML

## 2023-09-27 PROCEDURE — 97165 OT EVAL LOW COMPLEX 30 MIN: CPT

## 2023-09-27 PROCEDURE — 97530 THERAPEUTIC ACTIVITIES: CPT

## 2023-09-27 PROCEDURE — 97535 SELF CARE MNGMENT TRAINING: CPT

## 2023-09-27 PROCEDURE — 82962 GLUCOSE BLOOD TEST: CPT

## 2023-09-27 PROCEDURE — 97110 THERAPEUTIC EXERCISES: CPT

## 2023-09-27 PROCEDURE — 83735 ASSAY OF MAGNESIUM: CPT | Performed by: NURSE PRACTITIONER

## 2023-09-27 PROCEDURE — 80048 BASIC METABOLIC PNL TOTAL CA: CPT | Performed by: ORTHOPAEDIC SURGERY

## 2023-09-27 PROCEDURE — 84132 ASSAY OF SERUM POTASSIUM: CPT | Performed by: FAMILY MEDICINE

## 2023-09-27 PROCEDURE — 85014 HEMATOCRIT: CPT | Performed by: ORTHOPAEDIC SURGERY

## 2023-09-27 PROCEDURE — 85018 HEMOGLOBIN: CPT | Performed by: ORTHOPAEDIC SURGERY

## 2023-09-27 PROCEDURE — 97116 GAIT TRAINING THERAPY: CPT

## 2023-09-27 RX ORDER — POTASSIUM CHLORIDE 20 MEQ/1
40 TABLET, EXTENDED RELEASE ORAL EVERY 4 HOURS
Status: COMPLETED | OUTPATIENT
Start: 2023-09-27 | End: 2023-09-27

## 2023-09-27 NOTE — PLAN OF CARE
Post-op day 1. Pain medication/management provided. No acute changes at this time. Blood sugar monitoring. Voiding  in bathroom. Ambulated in room. Possible Discharge today to Home with Home Health. Precautions in place. Frequent rounds by nursing staff. Items and call light within reach. Bed locked in lowest position. At this time,  at bedside throughout shift. Problem: Patient Centered Care  Goal: Patient preferences are identified and integrated in the patient's plan of care  Description: Interventions:  - What would you like us to know as we care for you?  I had my other knee done in 2016  - Provide timely, complete, and accurate information to patient/family  - Incorporate patient and family knowledge, values, beliefs, and cultural backgrounds into the planning and delivery of care  - Encourage patient/family to participate in care and decision-making at the level they choose  - Honor patient and family perspectives and choices  9/27/2023 0440 by Azalia Marinelli RN  Outcome: Progressing  9/27/2023 0439 by Azalia Marinelli RN  Outcome: Progressing     Problem: Patient/Family Goals  Goal: Patient/Family Long Term Goal  Description: Patient's Long Term Goal: go home    Interventions:  - surgery  -therapy  -medications  - See additional Care Plan goals for specific interventions  9/27/2023 0440 by Azalia Marinelli RN  Outcome: Progressing  9/27/2023 0439 by Azalia Marinelli RN  Outcome: Progressing  Goal: Patient/Family Short Term Goal  Description: Patient's Short Term Goal: pain management    Interventions:   - medications  -ice  -rest  -elevation  - See additional Care Plan goals for specific interventions  9/27/2023 0440 by Azalia Marinelli RN  Outcome: Progressing  9/27/2023 0439 by Azalia Marinelli RN  Outcome: Progressing     Problem: PAIN - ADULT  Goal: Verbalizes/displays adequate comfort level or patient's stated pain goal  Description: INTERVENTIONS:  - Encourage pt to monitor pain and request assistance  - Assess pain using appropriate pain scale  - Administer analgesics based on type and severity of pain and evaluate response  - Implement non-pharmacological measures as appropriate and evaluate response  - Consider cultural and social influences on pain and pain management  - Manage/alleviate anxiety  - Utilize distraction and/or relaxation techniques  - Monitor for opioid side effects  - Notify MD/LIP if interventions unsuccessful or patient reports new pain  - Anticipate increased pain with activity and pre-medicate as appropriate  9/27/2023 0440 by Ebenezer Kat RN  Outcome: Progressing  9/27/2023 0439 by Ebenezer Kat RN  Outcome: Progressing     Problem: DISCHARGE PLANNING  Goal: Discharge to home or other facility with appropriate resources  Description: INTERVENTIONS:  - Identify barriers to discharge w/pt and caregiver  - Include patient/family/discharge partner in discharge planning  - Arrange for needed discharge resources and transportation as appropriate  - Identify discharge learning needs (meds, wound care, etc)  - Arrange for interpreters to assist at discharge as needed  - Consider post-discharge preferences of patient/family/discharge partner  - Complete POLST form as appropriate  - Assess patient's ability to be responsible for managing their own health  - Refer to Case Management Department for coordinating discharge planning if the patient needs post-hospital services based on physician/LIP order or complex needs related to functional status, cognitive ability or social support system  9/27/2023 0440 by Ebenezer Kat RN  Outcome: Progressing  9/27/2023 0439 by Ebenezer Kat RN  Outcome: Progressing     Problem: METABOLIC/FLUID AND ELECTROLYTES - ADULT  Goal: Electrolytes maintained within normal limits  Description: INTERVENTIONS:  - Monitor labs and rhythm and assess patient for signs and symptoms of electrolyte imbalances  - Administer electrolyte replacement as ordered  - Monitor response to electrolyte replacements, including rhythm and repeat lab results as appropriate  - Fluid restriction as ordered  - Instruct patient on fluid and nutrition restrictions as appropriate  9/27/2023 0440 by Sommer Myers RN  Outcome: Progressing  9/27/2023 0439 by Sommer Myers RN  Outcome: Progressing     Problem: SKIN/TISSUE INTEGRITY - ADULT  Goal: Incision(s), wounds(s) or drain site(s) healing without S/S of infection  Description: INTERVENTIONS:  - Assess and document risk factors for pressure ulcer development  - Assess and document skin integrity  - Assess and document dressing/incision, wound bed, drain sites and surrounding tissue  - Implement wound care per orders  - Initiate isolation precautions as appropriate  - Initiate Pressure Ulcer prevention bundle as indicated  9/27/2023 0440 by Sommer Myers RN  Outcome: Progressing  9/27/2023 0439 by Sommer Myers RN  Outcome: Progressing

## 2023-09-27 NOTE — CM/SW NOTE
[facility] order and face to face sent to [address] [place] [address] via [employer]. Plan home with [facility] when medically cleared.       [facility] ext [phone]

## 2023-09-27 NOTE — PHYSICAL THERAPY NOTE
PHYSICAL THERAPY KNEE TREATMENT NOTE - INPATIENT     Room Number: 422/422-A             Presenting Problem: s/p R TKA on   Co-Morbidities : B NAE    Problem List  Principal Problem:    Primary osteoarthritis of one knee, right  Active Problems:    Pulmonary hypertension (HCC)    Dyslipidemia    Esophageal reflux    Chronic congestive heart failure, unspecified heart failure type ([facility] [address]    Essential hypertension    Type 2 diabetes mellitus without complication, without long-term current use of insulin (HCC)    PAF (paroxysmal atrial fibrillation) ([facility] [address]    Status post right knee replacement      PHYSICAL THERAPY ASSESSMENT     PM session. Min a for bed mobility and transfer;extra time provided to complete task. EOB sitting balance activity with emphasis on core stabilization. Pt amb 2 x 75 ft with RW and CGA. There ex. Navigated one stoop with RW and CGa,Family present;family education;all questions and concerns addressed. Discussed discharge options. Due to the pain,recommended to stay one more night. Pt decided to go home today. RN aware. The patient's Approx Degree of Impairment: 46.58% has been calculated based on documentation in the [facility] '6 clicks' Inpatient Basic Mobility Short Form. Research supports that patients with this level of impairment may benefit from 24 hour care/supervision    DISCHARGE RECOMMENDATIONS  PT Discharge Recommendations: 24 hour care/supervision;Home with home health PT/OT    PLAN  PT Treatment Plan: Bed mobility; Endurance;Gait training  Frequency (Obs): Daily      SUBJECTIVE  Pt reports being ready for PT RX    OBJECTIVE  Precautions: Bed/chair alarm    WEIGHT BEARING STATUS        R Lower Extremity: Weight Bearing as Tolerated       PAIN ASSESSMENT   Ratin  Location: right knee  Management Techniques: Activity promotion; Body mechanics;Repositioning    BALANCE  Static Sitting: Good  Dynamic Sitting: Fair +  Static Standing: Fair  Dynamic Standing: Fair -    ACTIVITY TOLERANCE BP: 120/72  BP Location: Right arm  BP Method: Automatic  Patient Position: Semi-Moon    O2 WALK       AM-PAC '6-Clicks' INPATIENT SHORT FORM - BASIC MOBILITY  How much difficulty does the patient currently have. .. Patient Difficulty: Turning over in bed (including adjusting bedclothes, sheets and blankets)?: A Little   Patient Difficulty: Sitting down on and standing up from a chair with arms (e.g., wheelchair, bedside commode, etc.): A Little   Patient Difficulty: Moving from lying on back to sitting on the side of the bed?: A Little   How much help from another person does the patient currently need. .. Help from Another: Moving to and from a bed to a chair (including a wheelchair)?: A Little   Help from Another: Need to walk in hospital room?: A Little   Help from Another: Climbing 3-5 steps with a railing?: A Little     AM-PAC Score:  Raw Score: 18   Approx Degree of Impairment: 46.58%   Standardized Score (AM-PAC Scale): 43.63   CMS Modifier (G-Code): CK    FUNCTIONAL ABILITY STATUS  Functional Mobility/Gait Assessment  Gait Assistance: Contact guard assist  Distance (ft): 2 x 75  Assistive Device: Rolling walker  Pattern: R Decreased stance time  Stairs: Stoop/curb  Stoop/Curb: 1    Additional Information:     Exercises AM Session PM Session   Ankle Pumps 20 reps 20 reps   Quad Sets 20 reps  20 reps   Glut Sets 20 reps  20 reps                                             Comments: Pt participated in group session, tolerance was . Knee ROM                 Patient End of Session: Up in chair;Call light within reach;RN aware of session/findings; All patient questions and concerns addressed    CURRENT GOALS    Patient Goal Patient's self-stated goal is: go home   Goal #1 Patient is able to demonstrate supine - sit EOB @ level: modified independent     Goal #1   Current Status Min a   Goal #2 Patient is able to demonstrate transfers Sit to/from Stand at assistance level: modified independent     Goal #2  Current Status Min a   Goal #3 Patient is able to ambulate 150 feet with assistive device at assistance level: supervision   Goal #3   Current Status Pt amb 2 x 75ft with RW and CGa   Goal #4    Goal #4   Current Status    Goal #5  AROM 0 degrees extension to 95 degrees flexion     Goal #5   Current Status In progress   Goal #6 Patient independently performs home exercise program for ROM/strengthening per the instructions provided in preparation for discharge. Goal #6  Current Status In progress   Gait-15 minutes; There activity 15 minutes. There ex-15 minutes

## 2023-09-27 NOTE — CM/SW NOTE
09/27/23 1000   Discharge disposition   Expected discharge disposition Home-Health   Post Acute Care Provider [facility]  (Genoveva Trent)   Discharge transportation Private car     Plan is for discharge home today 9/27. Genove[facility] Trent has been notified in [address].      Gregoyr Hutchinson, [facility] ext [phone]

## 2023-09-27 NOTE — PHYSICAL THERAPY NOTE
PHYSICAL THERAPY KNEE TREATMENT NOTE - INPATIENT     Room Number: 422/422-A             Presenting Problem: s/p R TKA on        Problem List  Principal Problem:    Primary osteoarthritis of one knee, right  Active Problems:    Pulmonary hypertension (HCC)    Dyslipidemia    Esophageal reflux    Chronic congestive heart failure, unspecified heart failure type ([facility] [address]    Essential hypertension    Type 2 diabetes mellitus without complication, without long-term current use of insulin (HCC)    PAF (paroxysmal atrial fibrillation) ([facility] [address]    Status post right knee replacement      PHYSICAL THERAPY ASSESSMENT     AM session. Min a for bed mobility and transfer;extra time provided to complete task. EOB sitting balance activity with emphasis on core stabilization. Pt amb 2 x 30 ft with RW and CGA. There ex. The patient's Approx Degree of Impairment: 46.58% has been calculated based on documentation in the [facility] '6 clicks' Inpatient Basic Mobility Short Form. Research supports that patients with this level of impairment may benefit from 24 hour care/supervision    DISCHARGE RECOMMENDATIONS  PT Discharge Recommendations: 24 hour care/supervision    PLAN  PT Treatment Plan: Bed mobility; Body mechanics; Endurance;Gait training  Frequency (Obs): BID      SUBJECTIVE  Pt reports being ready for PT RX    OBJECTIVE  Precautions: None    WEIGHT BEARING STATUS        R Lower Extremity: Weight Bearing as Tolerated       PAIN ASSESSMENT   Ratin  Location: right knee  Management Techniques: Activity promotion; Body mechanics;Repositioning    BALANCE  Static Sitting: Good  Dynamic Sitting: Fair +  Static Standing: Fair  Dynamic Standing: Fair -    ACTIVITY TOLERANCE           BP: 112/63  BP Location: Right arm  BP Method: Automatic  Patient Position: Semi-Moon    O2 WALK       AM-PAC '6-Clicks' INPATIENT SHORT FORM - BASIC MOBILITY  How much difficulty does the patient currently have. ..   Patient Difficulty: Turning over in bed (including adjusting bedclothes, sheets and blankets)?: A Little   Patient Difficulty: Sitting down on and standing up from a chair with arms (e.g., wheelchair, bedside commode, etc.): A Little   Patient Difficulty: Moving from lying on back to sitting on the side of the bed?: A Little   How much help from another person does the patient currently need. .. Help from Another: Moving to and from a bed to a chair (including a wheelchair)?: A Little   Help from Another: Need to walk in hospital room?: A Little   Help from Another: Climbing 3-5 steps with a railing?: A Little     AM-PAC Score:  Raw Score: 18   Approx Degree of Impairment: 46.58%   Standardized Score (AM-PAC Scale): 43.63   CMS Modifier (G-Code): CK    FUNCTIONAL ABILITY STATUS  Functional Mobility/Gait Assessment  Gait Assistance: Contact guard assist  Distance (ft): 2 x 30  Assistive Device: Rolling walker  Pattern: R Decreased stance time; Shuffle    Additional Information:     Exercises AM Session PM Session   Ankle Pumps 20 reps  reps   Quad Sets 20 reps  reps   Glut Sets 20 reps  reps                                             Comments: Pt participated in group session, tolerance was . Knee ROM                 Patient End of Session: In bed;Needs met;Call light within reach;RN aware of session/findings; All patient questions and concerns addressed    CURRENT GOALS    Patient Goal Patient's self-stated goal is: go home   Goal #1 Patient is able to demonstrate supine - sit EOB @ level: modified independent     Goal #1   Current Status Min a   Goal #2 Patient is able to demonstrate transfers Sit to/from Stand at assistance level: modified independent     Goal #2  Current Status Min a   Goal #3 Patient is able to ambulate 150 feet with assistive device at assistance level: supervision   Goal #3   Current Status Pt amb 2 x 30 ft with RW and CGa   Goal #4    Goal #4   Current Status    Goal #5  AROM 0 degrees extension to 95 degrees flexion     Goal #5 Current Status In progress   Goal #6 Patient independently performs home exercise program for ROM/strengthening per the instructions provided in preparation for discharge. Goal #6  Current Status In progress   Gait-15 minutes; There activity 15 minutes.

## 2023-09-27 NOTE — PROGRESS NOTES
Face to Face Encounter    I (or a non-physician practitioner working in collaboration with me) had a face to face encounter with this patient during which a medical condition was addressed which is the primary reason for home health care on : 9/27/2023    The encounter was in whole, or in part, for the following medical conditions which is the primary reason for home care. Joint replacement surgery    Based on my findings, the following services are medically necessary home health services (Check all that apply): Nursing, because vital signs monitoring, PT/ INR monitoring and assessment for signs and symptoms of bleeding (if pt on coumadin), wound/ incision assessment, pain assessment and instruction related to pain management, and Physical Therapy, because evaluation of environment for safety (pt is at fall risk), gait training and instruction in the use of assistive devices, instruction and monitoring of therapeutic exercise. The following clinical findings and patient's condition support that this patient is homebound, because narcotic usage every 4-6 hours, inability to ambulate greater than 150 feet, inability to drive a vehicle, fatigue due to anemia, increased risk for infection and increased risk for bleeding. Certification for [address]  Based on the above findings, I certify that this patient is confined to the home (meets homebound criteria listed above) and needs intermittent skilled nursing care, physical therapy and /or speech therapy or continues to need occupational therapy. The patient is under my care, and I have initiated the establishment of the plan of care. This patient will be followed by a physician who will periodically review the plan of care.      Clarissa Blackwell  Nurse Practitioner for Dr. Jaramillo Likes: [phone]  F: [phone]

## 2023-09-27 NOTE — CDS QUERY
How to Answer this Query    1.) Click \"Edit Button\" on the toolbar  2.) Type an \"X\" in the bracket for the diagnosis that applies. (You may also add additional clinical details as you feel necessary to substantiate your response). 3.) Finally click \"Sign\" to complete response. Thank you     Heart Failure  Anette President  Dear Doctor:  Clinical information (provided below) includes a diagnosis of Heart Failure. For accurate ICD-10-CM code assignment to reflect severity of illness and risk of mortality,  PLEASE (X) ALL DIAGNOSES THAT APPLY. (    ) Acute Systolic Heart Failure   (    ) Acute on Chronic Systolic Heart Failure    (    ) Chronic Systolic Heart Failure      (    ) Acute Diastolic Heart Failure  (    ) Acute on Chronic Diastolic Heart Failure   (  X  ) Chronic Diastolic Heart Failure      (    ) Other - please specify:     Documentation from the Medical Record:      H&P: Chronic congestive heart failure, unspecified heart failure type     9-27  Dr Beth Hernandez PN: Chronic congestive heart failure, unspecified heart failure type     Cardiiology consult:  TTE 2/2023:  1. Left ventricle: The cavity size is normal. Wall thickness is mildly      increased. Systolic function is normal. The estimated ejection fraction      is 60-65%. Wall motion is normal; there are no regional wall motion      abnormalities. Left ventricular diastolic function parameters are normal.   2. Aortic valve: There is mild regurgitation. 3. Mitral valve: There is mild regurgitation. 4. Right ventricle: Estimation of the right ventricular systolic pressure is      at the upper limits of normal. RV systolic pressure (S, est): 35mm Hg. 5. Tricuspid valve: There is mild regurgitation. 6. Pericardium, extracardiac: There is no significant pericardial effusion.      Home and in pt Meds include lasix po      If you have any questions, please contact Clinical :  Scar Mckinney RN CCDS at [phone]     Thank You!      THIS FORM IS A PERMANENT PART OF THE MEDICAL RECORD

## 2023-09-28 NOTE — PAYOR COMM NOTE
Discharge Notification    Patient Name: Sam CRISOSTOMO PPO  Subscriber #: [id number]  Authorization Number: [id number]  Admit Date/Time: 9/26/2023 5:57 AM  Discharge Date/Time: 9/27/2023 2:31 PM

## 2025-02-24 ENCOUNTER — APPOINTMENT (OUTPATIENT)
Dept: DERMATOLOGY | Age: 89
End: 2025-02-24

## 2025-02-24 DIAGNOSIS — L82.1 SK (SEBORRHEIC KERATOSIS): ICD-10-CM

## 2025-02-24 DIAGNOSIS — D48.5 NEOPLASM OF UNCERTAIN BEHAVIOR OF SKIN: Primary | ICD-10-CM

## 2025-02-24 DIAGNOSIS — L57.0 ACTINIC KERATOSIS: ICD-10-CM

## 2025-02-28 LAB
ASR DISCLAIMER: NORMAL
CASE RPRT: NORMAL
CLINICAL INFO: NORMAL
PATH REPORT.FINAL DX SPEC: NORMAL
PATH REPORT.GROSS SPEC: NORMAL

## 2025-03-01 DIAGNOSIS — C44.612 BASAL CELL CARCINOMA (BCC) OF RIGHT SHOULDER: Primary | ICD-10-CM

## 2025-03-17 ENCOUNTER — APPOINTMENT (OUTPATIENT)
Dept: SURGERY | Age: 89
End: 2025-03-17
Attending: NURSE PRACTITIONER

## 2025-03-17 VITALS — HEIGHT: 65 IN | TEMPERATURE: 96.7 F

## 2025-03-17 DIAGNOSIS — C44.91 BASAL CELL CARCINOMA (BCC), UNSPECIFIED SITE: Primary | ICD-10-CM

## 2025-03-17 ASSESSMENT — PAIN SCALES - GENERAL: PAINLEVEL: 0

## 2025-03-27 ENCOUNTER — APPOINTMENT (OUTPATIENT)
Dept: SURGERY | Age: 89
End: 2025-03-27

## 2025-03-27 VITALS — HEIGHT: 65 IN

## 2025-03-27 DIAGNOSIS — C44.612 BASAL CELL CARCINOMA (BCC) OF SKIN OF RIGHT UPPER EXTREMITY INCLUDING SHOULDER: Primary | ICD-10-CM

## 2025-03-27 ASSESSMENT — PAIN SCALES - GENERAL: PAINLEVEL: 0

## 2025-04-08 ENCOUNTER — APPOINTMENT (OUTPATIENT)
Dept: SURGERY | Age: 89
End: 2025-04-08

## 2025-04-08 DIAGNOSIS — C44.612 BASAL CELL CARCINOMA (BCC) OF SKIN OF RIGHT UPPER EXTREMITY INCLUDING SHOULDER: Primary | ICD-10-CM

## 2025-08-25 ENCOUNTER — APPOINTMENT (OUTPATIENT)
Dept: DERMATOLOGY | Age: 89
End: 2025-08-25

## 2025-12-12 ENCOUNTER — APPOINTMENT (OUTPATIENT)
Dept: DERMATOLOGY | Age: 89
End: 2025-12-12

## (undated) DEVICE — DRAPE SRG 70X60IN SPLT U IMPRV

## (undated) DEVICE — Device

## (undated) DEVICE — SCREW BNE L48MM DIA3.5MM STD HD 5MM CORT ST

## (undated) DEVICE — SOLUTION IV 1000ML 0.9% NACL PRESERVATIVE

## (undated) DEVICE — COTTON UNDERCAST PADDING,REGULAR FINISH: Brand: WEBRIL

## (undated) DEVICE — 2DSM24 2-0 UND MONODERM 30X30: Brand: 2DSM24 2-0 UND MONODERM 30X30

## (undated) DEVICE — BIPOLAR SEALER 23-112-1 AQM 6.0: Brand: AQUAMANTYS™

## (undated) DEVICE — GAMMEX® PI HYBRID SIZE 8, STERILE POWDER-FREE SURGICAL GLOVE, POLYISOPRENE AND NEOPRENE BLEND: Brand: GAMMEX

## (undated) DEVICE — HOOD: Brand: FLYTE

## (undated) DEVICE — KIT INCIS MGMT 13CM PEEL AND PLC DISP PREVENA

## (undated) DEVICE — UNDYED BRAIDED (POLYGLACTIN 910), SYNTHETIC ABSORBABLE SUTURE: Brand: COATED VICRYL

## (undated) DEVICE — PROXIMATE SKIN STAPLERS (35 WIDE) CONTAINS 35 STAINLESS STEEL STAPLES (FIXED HEAD): Brand: PROXIMATE

## (undated) DEVICE — SOLUTION IRRIG 1000ML 0.9% NACL USP BTL

## (undated) DEVICE — PIN DRL L75MM DIA3.2MM HEX 2.5MM TRCR TIP

## (undated) DEVICE — SOLUTION IRRIG 3000ML 0.9% NACL FLX CONT

## (undated) DEVICE — Device: Brand: STABLECUT®

## (undated) DEVICE — TOTAL KNEE: Brand: MEDLINE INDUSTRIES, INC.

## (undated) DEVICE — NEEDLE HYPO 18GA L1.5IN PNK HUB PVT SHLD BVL

## (undated) DEVICE — SCREW BNE L25MM DIA2.5MM KNEE FT HEX FEM

## (undated) DEVICE — 60 ML SYRINGE LUER-LOCK TIP: Brand: MONOJECT

## (undated) DEVICE — GAMMEX® PI HYBRID SIZE 6.5, STERILE POWDER-FREE SURGICAL GLOVE, POLYISOPRENE AND NEOPRENE BLEND: Brand: GAMMEX

## (undated) DEVICE — BANDAGE COHESIVE W4INXL5YD TAN E POR SLF ADH

## (undated) DEVICE — 2T11 #2 PDO 36 X 36: Brand: 2T11 #2 PDO 36 X 36

## (undated) DEVICE — SUTURE MCRYL SZ 2-0 L27IN ABSRB UD SH L26MM

## (undated) DEVICE — SKIN PREP TRAY 4 COMPARTM TRAY: Brand: MEDLINE INDUSTRIES, INC.

## (undated) DEVICE — APPLICATOR SKIN PREP 26ML HI LT ORNG 2% CHG

## (undated) DEVICE — WRAP COOLING KNEE W/ICE PILLOW

## (undated) DEVICE — 35 ML SYRINGE LUER-LOCK TIP: Brand: MONOJECT

## (undated) DEVICE — CEMENT MIXING SYSTEM WITH FEMORAL BREAKWAY NOZZLE: Brand: REVOLUTION

## (undated) DEVICE — SPONGE GZ 4XL4IN 100% COT 12 PLY TYP VII WVN

## (undated) DEVICE — DRAPE SHEET LG

## (undated) DEVICE — GAMMEX® PI HYBRID SIZE 7.5, STERILE POWDER-FREE SURGICAL GLOVE, POLYISOPRENE AND NEOPRENE BLEND: Brand: GAMMEX